# Patient Record
Sex: FEMALE
[De-identification: names, ages, dates, MRNs, and addresses within clinical notes are randomized per-mention and may not be internally consistent; named-entity substitution may affect disease eponyms.]

---

## 2017-01-25 ENCOUNTER — APPOINTMENT (OUTPATIENT)
Dept: HUMAN REPRODUCTION | Facility: CLINIC | Age: 32
End: 2017-01-25

## 2017-01-31 ENCOUNTER — APPOINTMENT (OUTPATIENT)
Dept: HUMAN REPRODUCTION | Facility: CLINIC | Age: 32
End: 2017-01-31

## 2017-02-23 ENCOUNTER — APPOINTMENT (OUTPATIENT)
Dept: HUMAN REPRODUCTION | Facility: CLINIC | Age: 32
End: 2017-02-23

## 2017-02-23 DIAGNOSIS — N97.9 FEMALE INFERTILITY, UNSPECIFIED: ICD-10-CM

## 2017-02-27 ENCOUNTER — RX RENEWAL (OUTPATIENT)
Age: 32
End: 2017-02-27

## 2017-02-27 DIAGNOSIS — E03.9 HYPOTHYROIDISM, UNSPECIFIED: ICD-10-CM

## 2017-03-03 ENCOUNTER — APPOINTMENT (OUTPATIENT)
Dept: HUMAN REPRODUCTION | Facility: CLINIC | Age: 32
End: 2017-03-03

## 2017-03-07 ENCOUNTER — APPOINTMENT (OUTPATIENT)
Dept: HUMAN REPRODUCTION | Facility: CLINIC | Age: 32
End: 2017-03-07

## 2017-08-25 ENCOUNTER — RESULT REVIEW (OUTPATIENT)
Age: 32
End: 2017-08-25

## 2018-08-09 ENCOUNTER — HOSPITAL ENCOUNTER (OUTPATIENT)
Dept: HOSPITAL 14 - H.OPSURG | Age: 33
Setting detail: OBSERVATION
LOS: 1 days | Discharge: HOME | End: 2018-08-10
Attending: OBSTETRICS & GYNECOLOGY | Admitting: OBSTETRICS & GYNECOLOGY
Payer: COMMERCIAL

## 2018-08-09 VITALS — BODY MASS INDEX: 23.8 KG/M2

## 2018-08-09 VITALS — RESPIRATION RATE: 18 BRPM

## 2018-08-09 DIAGNOSIS — N80.1: ICD-10-CM

## 2018-08-09 DIAGNOSIS — N80.5: ICD-10-CM

## 2018-08-09 DIAGNOSIS — D25.9: ICD-10-CM

## 2018-08-09 DIAGNOSIS — N80.0: ICD-10-CM

## 2018-08-09 DIAGNOSIS — N93.8: ICD-10-CM

## 2018-08-09 DIAGNOSIS — N80.4: ICD-10-CM

## 2018-08-09 DIAGNOSIS — N73.6: ICD-10-CM

## 2018-08-09 DIAGNOSIS — N80.3: Primary | ICD-10-CM

## 2018-08-09 DIAGNOSIS — N13.4: ICD-10-CM

## 2018-08-09 LAB
BASOPHILS # BLD AUTO: 0.1 K/UL (ref 0–0.2)
BASOPHILS NFR BLD: 1.4 % (ref 0–2)
EOSINOPHIL # BLD AUTO: 0.1 K/UL (ref 0–0.7)
EOSINOPHIL NFR BLD: 2.7 % (ref 0–4)
ERYTHROCYTE [DISTWIDTH] IN BLOOD BY AUTOMATED COUNT: 14.6 % (ref 11.5–14.5)
HGB BLD-MCNC: 12 G/DL (ref 12–16)
LYMPHOCYTES # BLD AUTO: 1.3 K/UL (ref 1–4.3)
LYMPHOCYTES NFR BLD AUTO: 24.2 % (ref 20–40)
MCH RBC QN AUTO: 25.4 PG (ref 27–31)
MCHC RBC AUTO-ENTMCNC: 32.3 G/DL (ref 33–37)
MCV RBC AUTO: 78.7 FL (ref 81–99)
MONOCYTES # BLD: 0.5 K/UL (ref 0–0.8)
MONOCYTES NFR BLD: 9.1 % (ref 0–10)
NEUTROPHILS # BLD: 3.3 K/UL (ref 1.8–7)
NEUTROPHILS NFR BLD AUTO: 62.6 % (ref 50–75)
NRBC BLD AUTO-RTO: 0.1 % (ref 0–0)
PLATELET # BLD: 242 K/UL (ref 130–400)
PMV BLD AUTO: 9.2 FL (ref 7.2–11.7)
RBC # BLD AUTO: 4.71 MIL/UL (ref 3.8–5.2)
WBC # BLD AUTO: 5.2 K/UL (ref 4.8–10.8)

## 2018-08-09 PROCEDURE — 58561 HYSTEROSCOPY REMOVE MYOMA: CPT

## 2018-08-09 PROCEDURE — 88304 TISSUE EXAM BY PATHOLOGIST: CPT

## 2018-08-09 PROCEDURE — 58559 HYSTEROSCOPY LYSIS: CPT

## 2018-08-09 PROCEDURE — 36415 COLL VENOUS BLD VENIPUNCTURE: CPT

## 2018-08-09 PROCEDURE — 86850 RBC ANTIBODY SCREEN: CPT

## 2018-08-09 PROCEDURE — 52005 CYSTO W/URTRL CATHJ: CPT

## 2018-08-09 PROCEDURE — 88305 TISSUE EXAM BY PATHOLOGIST: CPT

## 2018-08-09 PROCEDURE — 86900 BLOOD TYPING SEROLOGIC ABO: CPT

## 2018-08-09 PROCEDURE — 44970 LAPAROSCOPY APPENDECTOMY: CPT

## 2018-08-09 PROCEDURE — 85025 COMPLETE CBC W/AUTO DIFF WBC: CPT

## 2018-08-09 PROCEDURE — 58560 HYSTEROSCOPY RESECT SEPTUM: CPT

## 2018-08-09 PROCEDURE — 49321 LAPAROSCOPY BIOPSY: CPT

## 2018-08-09 RX ADMIN — HYDROMORPHONE HYDROCHLORIDE PRN MG: 1 INJECTION, SOLUTION INTRAMUSCULAR; INTRAVENOUS; SUBCUTANEOUS at 13:30

## 2018-08-09 RX ADMIN — HYDROMORPHONE HYDROCHLORIDE PRN MG: 1 INJECTION, SOLUTION INTRAMUSCULAR; INTRAVENOUS; SUBCUTANEOUS at 13:12

## 2018-08-09 RX ADMIN — HYDROMORPHONE HYDROCHLORIDE PRN MG: 1 INJECTION, SOLUTION INTRAMUSCULAR; INTRAVENOUS; SUBCUTANEOUS at 13:50

## 2018-08-10 VITALS
HEART RATE: 88 BPM | OXYGEN SATURATION: 99 % | DIASTOLIC BLOOD PRESSURE: 77 MMHG | SYSTOLIC BLOOD PRESSURE: 132 MMHG | TEMPERATURE: 98.4 F

## 2018-08-13 NOTE — OP
PROCEDURE DATE:  08/09/2018



SURGEON: Travis Quinteros MD



ASSISTANT: Louis Swenson MD 



ANESTHESIOLOGIST: 1. Romy SANCHEZ, Janey 2. Nicho Carmona MD



ANESTHETIC: General 



PREOPERATIVE DIAGNOSES:

1.  Incapacitating pelvic pain.

2.  Incapacitating abdominal pain.

3.  Abnormal uterine bleeding.

4.  History of pelvic endometriosis.

5.  History of previous failed medical surgical therapy.

6.  History of severe endometriosis and pelvic adhesions.

7.  Gastrointestinal and genitourinary symptoms.

8.  Rule out interstitial cystitis.

9.  Adenomyosis.



POSTOPERATIVE DIAGNOSES:

1.  Incapacitating pelvic pain.

2.  Incapacitating abdominal pain.

3.  Abnormal uterine bleeding.

4.  History of pelvic endometriosis.

5.  History of previous failed medical surgical therapy.

6.  History of severe endometriosis and pelvic adhesions.

7.  Gastrointestinal and genitourinary symptoms.

8.  Rule out interstitial cystitis.

9.  Adenomyosis.

10.  Severe pelvic endometriosis.

11.  Ovarian adhesions.

12.  Bowel adhesions.

13.  Subseptate uterus.

14.  Mild bilateral hydroureters.



OPERATION PERFORMED:

1.  Examination under anesthesia.

2.  Video_assisted hysteroscopy.

3.  Hysteroscopic septoplasty.

4.  Robotic da Kael laparoscopy.

5.  Enterolysis.

6.  Bilateral ureterolysis.

7.  Bilateral salpingo_ovariolytis.

8.  Multiple peritoneal biopsies and excision of endometriosis.

9.  Treatment of endometriosis.

10.  Shaving of endometriosis of the bowel.

11.  Cystoscopy.

12.  Bilateral ureteral catheterization and injection of IC-Green dye.



Dr. Swenson from General Surgery was consulted to perform 1-Appendectomy 
procedure and he will dictate that separately.



COMPLICATIONS:  None.



SAMPLES:  



DRAINS:  



ESTIMATED BLOOD LOSS:  Minimal.



HISTORY: MD PROVIDES HISTORY



FINDINGS:  



Genitalia:  normal, external genitalia, cervix normal without lesions or 
polyps.  



Hysteroscopy showed evidence of a small septum of the fundus of the uterus and 
no other lesions visualized.  



Cystoscopy was performed to rule out endometriosis of bladder mucosa and also 
interstitial cystitis, also  injury.  The bladder was normal with no evidence 
of stone, trigonitis or cystitis.  Positive jet flow visualized in both 
ureters.  



Laparoscopy was normal, gallbladder was normal, liver edges appeared to be 
normal.  Ascending colon and transverse were normal.  The appendix appeared to 
be abnormal with both fibrosis and thickening.  There was evidence of severe 
adhesions, fibrosis and endometriosis of the rectovaginal septum and attachment 
of the bowel to the posterior aspect of the uterus and to both the right and 
left adnexa.  Both ovaries were severely attached to the posterior aspect of 
the ureters with endometriosis and adhesions.  Fallopian tubes appeared to have 
some inflammatory changes of adhesions, but overall appeared to be normal.  
Both ovaries appeared to be involved with scar.  There was also evidence of 
endometriosis of the rectovaginal septum in right and left perirectal areas and 
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  There 
was also evidence of severe retroperitoneal fibrosis in this area.  There was 
also evidence of mild bilateral hydroureters.



CONSENT:  The patient had been thoroughly evaluated and counseled regarding 
pros and cons of the procedure, the reasonable alternative, and the possible 
complications.  She understood and accepted the risks involved.  Appropriate 
literature was provided to the patient.  The patient was in understanding that 
given her history and presurgical exam, she knows that she was a high risk and 
average patient.  She accepted all the risks involved and all the questions had 
been answered to her satisfaction.



DESCRIPTION OF PROCEDURE:  



Initiation of the case:  After adequate anesthesia was obtained, the patient 
was placed in the dorsal lithotomy position with extreme care of placement of 
the patient without hyperextension or hyperflexing the hips.  At this point, 
the patient was prepped and draped, the surgeon was gowned and gloved.  A time-
out was taken according to the hospital procedure and the procedure was 
started.  At this point, we performed the cystoscopy and bilateral ureteral 
catheterization. 



At this point we performed cystoscopy:

A cystoscope was inserted into the bladder, under direct visualization and the 
bladder was visualized.  The bladder was free of lesions, tumors.  There was no 
evidence of interstitial cystitis, and there was only a mild amount of 
trigonitis.  At this point, both ureters were identified and appeared to be in 
normal anatomical position.  At this point, utilizing an open-ended 5-Gibraltarian 
catheter, the left ureter was catheterized all the way to the distal ureter, 
and a 5 mL of IC-Green were injected into the distal ureter.  Similarly, on the 
contralateral ureter, the ureter was catheterized all the way to the distal 
ureter, and a 5 mL of IC-Green were injected into the distal ureter.  At this 
point, the stents were removed, and the hysteroscope was removed and a 16-
Gibraltarian Hicks was placed into the bladder.  



At this point, we proceeded with a hysteroscopy:

A speculum was placed in the vagina, and the anterior lip of the cervix was 
grasped. The cervix was dilated and a hysteroscope was inserted into the 
cavity. The cavity appeared to be of normal size, but there was evidence of a 
subseptum at the top of the uterus.  At this point, we proceeded with excision 
of uterine septum.  Once the septum was identified, the endoscopic scissors 
were inserted into the uterine cavity and a progressive dissection of the 
septum was performed with great care not to perforate the uterus and not to 
cause any bleeding.  The procedure was basically bloodless and the septum was 
excised.  



At this point, we proceeded with placement of trocars and docking of the Da 
Kael Xi robot

 The surgeons were re-gowned and re-gloved, and an open laparoscopy was 
performed by making an incision below the umbilicus, and the fascia was incised
, and the peritoneum was entered in the blunt fashion.  The cannula was 
inserted and the abdomen was insufflated, and under direct visualization 3 
additional ports were inserted, left upper quadrant, left mid quadrant and 
right upper quadrant.  At this point, the da Kael Xi robot was brought into 
the field and docked, and the instruments were inserted under direct 
visualization.  With extreme care not to injure the bowel or any other area.  
As per the dictation, the upper abdomen appeared to be normal with no evidence 
of any lesions.  The pelvis had the findings described above, which included 
significant adhesions, fibrosis of the posterior cul-de-sac, significant 
endometriosis with deep endometriosis nodules.  Both ovary adherent to both the 
ovarian fossas and the posterior aspect of the uterus with significant 
inflammatory changes.  



At this point, we proceeded with the left ureterolysis.  

The ureter appeared to dilated and it was clearly identified utilizing 
fluorescent technology.  An incision was made on the peritoneum at the top of 
the pelvic brim, and incision was then carried down all the way opening the 
peritoneum and all the way down from the pelvic brim all the way down to the 
ovarian fossa extending the incision below the ovary.  It was a progressive 
dissection where the ureter was progressively lateralized and the peritoneum 
medialized, thus freeing the ureter all the way down to the crossing of the 
uterine vessels.  After this was done and the ureter was freed and lateralized 
and a large area of peritoneum, which had been opened up was excised and sent 
to pathology.  At this point, after ureter had been identified, we were able to 
elevate the ovary and dissect it from the pelvic side wall in the ovarian fossa



At this point, we proceeded with the left ovariolysis.  

The ovary was gently dissected and elevated off the ovarian fossa and area of 
fibrosis of endometriosis were exposed.  



At this point, we proceeded with the right ureterolysis.  

The ureter was identified and again utilizing florescent technology, the 
retroperitoneal space was entered and a full dissection was performed entering 
the retroperitoneal space and dissecting the ureter, removing the ureter 
laterally and the peritoneum medially.  A full dissection was performed all the 
way down to the ovarian fossa, on the crossing of the uterine arteries.  A 
large area of peritoneum containing endometriosis was also dissected and sent 
to Pathology. 



 At this point, we proceeded with a right ovariolysis.  

The ovary was progressively elevated, areas of deep endometriosis and 
superficial endometriosis were dissected out and the ovary was finally 
elevated.  



At this point, we proceeded with a treatment of endometriosis and excision of 
endometriosis.  

On the left hand side, a large area of peritoneum, where containing 
endometriosis was excised in the ovarian fossa with the upper margin of the 
excision at the utero_ovarian ligament all the way down to the uterosacral 
ligament.  Large areas of fibrosis were identified in posterior cul-de-sac and 
the rectovaginal space was affected with endometriosis and severe fibrosis.  
The rectovaginal area was then dissected and the space was opened, and we were 
able to dissect the rectum away from the posterior aspect of the cervix.  
Additional areas of endometriosis were dissected from the posterior aspect of 
the Uterus.  At this point, we proceeded with excision of the endometriosis on 
the right hand side where similarly in a full excision of endometriosis was 
performed by performing incision from starting at the right utero_ovarian 
ligament all the way down to the right uterosacral ligament.  At this point, 
Dr. Swenson from General Surgery was  called in and he performed _____________
procedure, which she will dictate separately.  At this point, it was checked 
for hemostasis and appeared to be excellent.  All the endometriosis had been 
excised.  At this point, we performed the ablation of inflamed peritoneum, 
utilizing the J_plasma device.  There were inflammatory areas in the posterior 
aspect of the uterus, which were not endometriotic, but they were purely 
inflammatory and these were not excisable, as they were not endometriotic.  
Therefore, we proceeded with ablation of such area utilizing the J plasma.  
Once this was done, it was checked for hemostasis and appeared to be excellent.
  The consult was handed to Dr. Swenson, who performed the ___________ and he 
will dictate that separately.  After this was done, it was checked for 
hemostasis and appeared to be excellent.  The pelvis was irrigated.  The da 
Kael Xi robot was removed.  The abdomen desufflated.  The instruments were 
removed.  The incisions were closed in layers with 0 PDS for the fascia and 4-0 
Monocryl for the skin.  The patient was awakened up and taken to recovery room 
in excellent condition.





______________

Travis Quinteros MD
[facility]

## 2021-03-23 ENCOUNTER — APPOINTMENT (OUTPATIENT)
Dept: HUMAN REPRODUCTION | Facility: CLINIC | Age: 36
End: 2021-03-23
Payer: COMMERCIAL

## 2021-03-23 PROCEDURE — 99205 OFFICE O/P NEW HI 60 MIN: CPT | Mod: 95

## 2021-04-20 ENCOUNTER — APPOINTMENT (OUTPATIENT)
Dept: HUMAN REPRODUCTION | Facility: CLINIC | Age: 36
End: 2021-04-20
Payer: COMMERCIAL

## 2021-04-20 PROCEDURE — 76830 TRANSVAGINAL US NON-OB: CPT

## 2021-04-20 PROCEDURE — 36415 COLL VENOUS BLD VENIPUNCTURE: CPT

## 2021-04-20 PROCEDURE — 99072 ADDL SUPL MATRL&STAF TM PHE: CPT

## 2021-04-20 PROCEDURE — 99213 OFFICE O/P EST LOW 20 MIN: CPT | Mod: 25

## 2021-04-23 ENCOUNTER — APPOINTMENT (OUTPATIENT)
Dept: HUMAN REPRODUCTION | Facility: CLINIC | Age: 36
End: 2021-04-23
Payer: COMMERCIAL

## 2021-04-23 PROCEDURE — 76830 TRANSVAGINAL US NON-OB: CPT

## 2021-04-23 PROCEDURE — 99213 OFFICE O/P EST LOW 20 MIN: CPT | Mod: 25

## 2021-04-23 PROCEDURE — 36415 COLL VENOUS BLD VENIPUNCTURE: CPT

## 2021-04-23 PROCEDURE — 99072 ADDL SUPL MATRL&STAF TM PHE: CPT

## 2021-04-26 ENCOUNTER — APPOINTMENT (OUTPATIENT)
Dept: HUMAN REPRODUCTION | Facility: CLINIC | Age: 36
End: 2021-04-26
Payer: COMMERCIAL

## 2021-04-26 PROCEDURE — 99072 ADDL SUPL MATRL&STAF TM PHE: CPT

## 2021-04-26 PROCEDURE — 76830 TRANSVAGINAL US NON-OB: CPT

## 2021-04-26 PROCEDURE — 36415 COLL VENOUS BLD VENIPUNCTURE: CPT

## 2021-04-26 PROCEDURE — 99213 OFFICE O/P EST LOW 20 MIN: CPT | Mod: 25

## 2021-04-27 ENCOUNTER — APPOINTMENT (OUTPATIENT)
Dept: HUMAN REPRODUCTION | Facility: CLINIC | Age: 36
End: 2021-04-27
Payer: COMMERCIAL

## 2021-04-27 PROCEDURE — 36415 COLL VENOUS BLD VENIPUNCTURE: CPT

## 2021-04-27 PROCEDURE — 99213 OFFICE O/P EST LOW 20 MIN: CPT | Mod: 25

## 2021-04-27 PROCEDURE — 76830 TRANSVAGINAL US NON-OB: CPT

## 2021-04-27 PROCEDURE — 99072 ADDL SUPL MATRL&STAF TM PHE: CPT

## 2021-04-28 ENCOUNTER — APPOINTMENT (OUTPATIENT)
Dept: HUMAN REPRODUCTION | Facility: CLINIC | Age: 36
End: 2021-04-28
Payer: COMMERCIAL

## 2021-04-28 PROCEDURE — 76830 TRANSVAGINAL US NON-OB: CPT

## 2021-04-28 PROCEDURE — 36415 COLL VENOUS BLD VENIPUNCTURE: CPT

## 2021-04-28 PROCEDURE — 99072 ADDL SUPL MATRL&STAF TM PHE: CPT

## 2021-04-28 PROCEDURE — 99214 OFFICE O/P EST MOD 30 MIN: CPT | Mod: 25

## 2021-04-30 ENCOUNTER — APPOINTMENT (OUTPATIENT)
Dept: HUMAN REPRODUCTION | Facility: CLINIC | Age: 36
End: 2021-04-30
Payer: COMMERCIAL

## 2021-04-30 PROCEDURE — 99072 ADDL SUPL MATRL&STAF TM PHE: CPT

## 2021-04-30 PROCEDURE — 99213 OFFICE O/P EST LOW 20 MIN: CPT | Mod: 25

## 2021-04-30 PROCEDURE — 58322 ARTIFICIAL INSEMINATION: CPT

## 2021-04-30 PROCEDURE — 89260 SPERM ISOLATION SIMPLE: CPT

## 2021-05-05 ENCOUNTER — APPOINTMENT (OUTPATIENT)
Dept: ENDOCRINOLOGY | Facility: CLINIC | Age: 36
End: 2021-05-05
Payer: COMMERCIAL

## 2021-05-05 VITALS
HEIGHT: 62 IN | SYSTOLIC BLOOD PRESSURE: 120 MMHG | BODY MASS INDEX: 27.23 KG/M2 | DIASTOLIC BLOOD PRESSURE: 80 MMHG | WEIGHT: 148 LBS

## 2021-05-05 DIAGNOSIS — Z87.19 PERSONAL HISTORY OF OTHER DISEASES OF THE DIGESTIVE SYSTEM: ICD-10-CM

## 2021-05-05 DIAGNOSIS — Z87.09 PERSONAL HISTORY OF OTHER DISEASES OF THE RESPIRATORY SYSTEM: ICD-10-CM

## 2021-05-05 DIAGNOSIS — Z78.9 OTHER SPECIFIED HEALTH STATUS: ICD-10-CM

## 2021-05-05 DIAGNOSIS — Z86.2 PERSONAL HISTORY OF DISEASES OF THE BLOOD AND BLOOD-FORMING ORGANS AND CERTAIN DISORDERS INVOLVING THE IMMUNE MECHANISM: ICD-10-CM

## 2021-05-05 DIAGNOSIS — Z87.898 PERSONAL HISTORY OF OTHER SPECIFIED CONDITIONS: ICD-10-CM

## 2021-05-05 DIAGNOSIS — Z83.3 FAMILY HISTORY OF DIABETES MELLITUS: ICD-10-CM

## 2021-05-05 PROCEDURE — 99204 OFFICE O/P NEW MOD 45 MIN: CPT

## 2021-05-05 PROCEDURE — 99072 ADDL SUPL MATRL&STAF TM PHE: CPT

## 2021-05-05 PROCEDURE — 99214 OFFICE O/P EST MOD 30 MIN: CPT

## 2021-05-05 RX ORDER — LETROZOLE TABLETS 2.5 MG/1
2.5 TABLET, FILM COATED ORAL DAILY
Qty: 5 | Refills: 0 | Status: DISCONTINUED | COMMUNITY
Start: 2017-02-23 | End: 2021-05-05

## 2021-05-05 RX ORDER — LEVOTHYROXINE SODIUM 0.05 MG/1
50 TABLET ORAL DAILY
Qty: 30 | Refills: 6 | Status: DISCONTINUED | COMMUNITY
Start: 2017-02-27 | End: 2021-05-05

## 2021-05-27 PROBLEM — Z78.9 CURRENT NON-SMOKER: Status: ACTIVE | Noted: 2021-05-27

## 2021-05-27 PROBLEM — Z87.19 HISTORY OF GASTROESOPHAGEAL REFLUX (GERD): Status: RESOLVED | Noted: 2021-05-27 | Resolved: 2021-05-27

## 2021-05-27 PROBLEM — Z86.2 HISTORY OF ANEMIA: Status: RESOLVED | Noted: 2021-05-27 | Resolved: 2021-05-27

## 2021-05-27 PROBLEM — Z87.19 HISTORY OF IBS: Status: RESOLVED | Noted: 2021-05-27 | Resolved: 2021-05-27

## 2021-05-27 PROBLEM — Z87.09 HISTORY OF ASTHMA: Status: RESOLVED | Noted: 2021-05-27 | Resolved: 2021-05-27

## 2021-05-27 PROBLEM — Z83.3 FAMILY HISTORY OF TYPE 2 DIABETES MELLITUS: Status: ACTIVE | Noted: 2021-05-27

## 2021-05-27 PROBLEM — Z87.898 HISTORY OF HEADACHE: Status: RESOLVED | Noted: 2021-05-27 | Resolved: 2021-05-27

## 2021-05-27 RX ORDER — PNV/FERROUS SULFATE/FOLIC ACID 27-<0.5MG
TABLET ORAL
Refills: 0 | Status: ACTIVE | COMMUNITY

## 2021-05-27 RX ORDER — ALBUTEROL 90 MCG
90 AEROSOL (GRAM) INHALATION
Refills: 0 | Status: ACTIVE | COMMUNITY

## 2021-05-27 RX ORDER — OMEPRAZOLE 20 MG/1
20 CAPSULE, DELAYED RELEASE ORAL
Refills: 0 | Status: ACTIVE | COMMUNITY

## 2021-05-27 NOTE — REVIEW OF SYSTEMS
[Negative] : Heme/Lymph [FreeTextEntry7] : occ nausea  [de-identified] : has HA at times s nupur neurology  [de-identified] : menses are regualr

## 2021-05-27 NOTE — HISTORY OF PRESENT ILLNESS
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

## 2021-05-27 NOTE — ASSESSMENT
[FreeTextEntry1] : Hypothyroidism inpatient who is trying to get pregnant patient is status post IUI April 30\par Had recently had levothyroxine increased to 50 mcg once a day\par Patient has been taking correctly\par  will check updated labs and adjust accordingly\par Check sonogram of thyroid\par \par History of anemia patient had been seeing your blood and cancer specialists0 we will check iron TIBC ferritin\par \par Family history diabetes check A1c

## 2021-06-02 ENCOUNTER — APPOINTMENT (OUTPATIENT)
Dept: ENDOCRINOLOGY | Facility: CLINIC | Age: 36
End: 2021-06-02

## 2021-07-06 ENCOUNTER — APPOINTMENT (OUTPATIENT)
Dept: HUMAN REPRODUCTION | Facility: CLINIC | Age: 36
End: 2021-07-06
Payer: COMMERCIAL

## 2021-07-06 PROCEDURE — 99215 OFFICE O/P EST HI 40 MIN: CPT | Mod: 95

## 2021-07-13 ENCOUNTER — APPOINTMENT (OUTPATIENT)
Dept: HUMAN REPRODUCTION | Facility: CLINIC | Age: 36
End: 2021-07-13
Payer: COMMERCIAL

## 2021-07-13 PROCEDURE — 36415 COLL VENOUS BLD VENIPUNCTURE: CPT

## 2021-07-13 PROCEDURE — 99072 ADDL SUPL MATRL&STAF TM PHE: CPT

## 2021-07-13 PROCEDURE — 76830 TRANSVAGINAL US NON-OB: CPT

## 2021-07-13 PROCEDURE — 99213 OFFICE O/P EST LOW 20 MIN: CPT | Mod: 25

## 2021-07-15 ENCOUNTER — APPOINTMENT (OUTPATIENT)
Dept: HUMAN REPRODUCTION | Facility: CLINIC | Age: 36
End: 2021-07-15
Payer: COMMERCIAL

## 2021-07-15 PROCEDURE — 99072 ADDL SUPL MATRL&STAF TM PHE: CPT

## 2021-07-15 PROCEDURE — 58340 CATHETER FOR HYSTEROGRAPHY: CPT

## 2021-07-15 PROCEDURE — 76831 ECHO EXAM UTERUS: CPT

## 2021-07-15 PROCEDURE — 99213 OFFICE O/P EST LOW 20 MIN: CPT | Mod: 25

## 2021-07-17 ENCOUNTER — APPOINTMENT (OUTPATIENT)
Dept: HUMAN REPRODUCTION | Facility: CLINIC | Age: 36
End: 2021-07-17

## 2021-07-19 ENCOUNTER — LABORATORY RESULT (OUTPATIENT)
Age: 36
End: 2021-07-19

## 2021-07-27 ENCOUNTER — APPOINTMENT (OUTPATIENT)
Dept: ENDOCRINOLOGY | Facility: CLINIC | Age: 36
End: 2021-07-27

## 2021-08-10 ENCOUNTER — APPOINTMENT (OUTPATIENT)
Dept: ENDOCRINOLOGY | Facility: CLINIC | Age: 36
End: 2021-08-10

## 2021-10-10 ENCOUNTER — RX RENEWAL (OUTPATIENT)
Age: 36
End: 2021-10-10

## 2021-10-10 RX ORDER — LEVOTHYROXINE SODIUM 50 UG/1
50 TABLET ORAL DAILY
Qty: 30 | Refills: 4 | Status: ACTIVE | COMMUNITY
Start: 2021-10-10 | End: 1900-01-01

## 2022-07-19 ENCOUNTER — APPOINTMENT (OUTPATIENT)
Dept: HUMAN REPRODUCTION | Facility: CLINIC | Age: 37
End: 2022-07-19

## 2022-07-19 PROCEDURE — 99215 OFFICE O/P EST HI 40 MIN: CPT | Mod: 95

## 2022-08-12 ENCOUNTER — APPOINTMENT (OUTPATIENT)
Dept: HUMAN REPRODUCTION | Facility: CLINIC | Age: 37
End: 2022-08-12

## 2022-08-12 PROCEDURE — 99213 OFFICE O/P EST LOW 20 MIN: CPT | Mod: 25

## 2022-08-12 PROCEDURE — 76830 TRANSVAGINAL US NON-OB: CPT

## 2022-08-12 PROCEDURE — 36415 COLL VENOUS BLD VENIPUNCTURE: CPT

## 2022-08-17 ENCOUNTER — APPOINTMENT (OUTPATIENT)
Dept: HUMAN REPRODUCTION | Facility: CLINIC | Age: 37
End: 2022-08-17

## 2022-08-17 PROCEDURE — 58340 CATHETER FOR HYSTEROGRAPHY: CPT

## 2022-08-17 PROCEDURE — 76831 ECHO EXAM UTERUS: CPT

## 2022-08-17 PROCEDURE — 99213 OFFICE O/P EST LOW 20 MIN: CPT | Mod: 25

## 2022-08-25 ENCOUNTER — APPOINTMENT (OUTPATIENT)
Dept: HUMAN REPRODUCTION | Facility: CLINIC | Age: 37
End: 2022-08-25

## 2022-08-25 PROCEDURE — 36415 COLL VENOUS BLD VENIPUNCTURE: CPT

## 2022-08-25 PROCEDURE — 99213 OFFICE O/P EST LOW 20 MIN: CPT | Mod: 25

## 2022-08-25 PROCEDURE — 76830 TRANSVAGINAL US NON-OB: CPT

## 2022-09-01 ENCOUNTER — APPOINTMENT (OUTPATIENT)
Dept: HUMAN REPRODUCTION | Facility: CLINIC | Age: 37
End: 2022-09-01

## 2022-09-01 ENCOUNTER — RESULT REVIEW (OUTPATIENT)
Age: 37
End: 2022-09-01

## 2022-09-01 PROCEDURE — 58100 BIOPSY OF UTERUS LINING: CPT

## 2022-09-01 PROCEDURE — 76856 US EXAM PELVIC COMPLETE: CPT

## 2022-09-01 PROCEDURE — 99072 ADDL SUPL MATRL&STAF TM PHE: CPT

## 2022-09-01 PROCEDURE — 58999 UNLISTED PX FML GENITAL SYS: CPT

## 2022-09-01 PROCEDURE — 99213 OFFICE O/P EST LOW 20 MIN: CPT | Mod: 25

## 2022-10-04 ENCOUNTER — TRANSCRIPTION ENCOUNTER (OUTPATIENT)
Age: 37
End: 2022-10-04

## 2022-11-02 ENCOUNTER — APPOINTMENT (OUTPATIENT)
Dept: HUMAN REPRODUCTION | Facility: CLINIC | Age: 37
End: 2022-11-02

## 2022-11-02 PROCEDURE — 76830 TRANSVAGINAL US NON-OB: CPT

## 2022-11-02 PROCEDURE — 36415 COLL VENOUS BLD VENIPUNCTURE: CPT

## 2022-11-02 PROCEDURE — 99215 OFFICE O/P EST HI 40 MIN: CPT | Mod: 25

## 2022-11-09 ENCOUNTER — APPOINTMENT (OUTPATIENT)
Dept: HUMAN REPRODUCTION | Facility: CLINIC | Age: 37
End: 2022-11-09

## 2022-11-09 PROCEDURE — 99213 OFFICE O/P EST LOW 20 MIN: CPT | Mod: 25

## 2022-11-09 PROCEDURE — 36415 COLL VENOUS BLD VENIPUNCTURE: CPT

## 2022-11-09 PROCEDURE — 76830 TRANSVAGINAL US NON-OB: CPT

## 2022-11-17 ENCOUNTER — APPOINTMENT (OUTPATIENT)
Dept: HUMAN REPRODUCTION | Facility: CLINIC | Age: 37
End: 2022-11-17

## 2022-11-17 PROCEDURE — 58999I: CUSTOM

## 2022-11-17 PROCEDURE — 76856 US EXAM PELVIC COMPLETE: CPT

## 2022-12-28 ENCOUNTER — APPOINTMENT (OUTPATIENT)
Dept: HUMAN REPRODUCTION | Facility: CLINIC | Age: 37
End: 2022-12-28
Payer: COMMERCIAL

## 2022-12-28 PROCEDURE — 99213 OFFICE O/P EST LOW 20 MIN: CPT | Mod: 25

## 2022-12-28 PROCEDURE — 76830 TRANSVAGINAL US NON-OB: CPT

## 2022-12-28 PROCEDURE — 36415 COLL VENOUS BLD VENIPUNCTURE: CPT

## 2023-01-04 ENCOUNTER — APPOINTMENT (OUTPATIENT)
Dept: HUMAN REPRODUCTION | Facility: CLINIC | Age: 38
End: 2023-01-04
Payer: COMMERCIAL

## 2023-01-04 PROCEDURE — 76857 US EXAM PELVIC LIMITED: CPT

## 2023-01-04 PROCEDURE — 99213 OFFICE O/P EST LOW 20 MIN: CPT | Mod: 25

## 2023-01-04 PROCEDURE — 36415 COLL VENOUS BLD VENIPUNCTURE: CPT

## 2023-01-05 ENCOUNTER — APPOINTMENT (OUTPATIENT)
Dept: HUMAN REPRODUCTION | Facility: CLINIC | Age: 38
End: 2023-01-05
Payer: COMMERCIAL

## 2023-01-05 PROCEDURE — 76857 US EXAM PELVIC LIMITED: CPT

## 2023-01-05 PROCEDURE — 99213 OFFICE O/P EST LOW 20 MIN: CPT | Mod: 25

## 2023-01-05 PROCEDURE — 36415 COLL VENOUS BLD VENIPUNCTURE: CPT

## 2023-01-06 ENCOUNTER — APPOINTMENT (OUTPATIENT)
Dept: HUMAN REPRODUCTION | Facility: CLINIC | Age: 38
End: 2023-01-06
Payer: COMMERCIAL

## 2023-01-06 PROCEDURE — 36415 COLL VENOUS BLD VENIPUNCTURE: CPT

## 2023-01-06 PROCEDURE — 76857 US EXAM PELVIC LIMITED: CPT

## 2023-01-06 PROCEDURE — 99213 OFFICE O/P EST LOW 20 MIN: CPT | Mod: 25

## 2023-01-11 ENCOUNTER — APPOINTMENT (OUTPATIENT)
Dept: HUMAN REPRODUCTION | Facility: CLINIC | Age: 38
End: 2023-01-11
Payer: COMMERCIAL

## 2023-01-11 PROCEDURE — 89398A: CUSTOM

## 2023-01-11 PROCEDURE — 58974 EMBRYO TRANSFER INTRAUTERINE: CPT

## 2023-01-11 PROCEDURE — 76998 US GUIDE INTRAOP: CPT

## 2023-01-11 PROCEDURE — 89255 PREPARE EMBRYO FOR TRANSFER: CPT

## 2023-01-11 PROCEDURE — 89352 THAWING CRYOPRESRVED EMBRYO: CPT

## 2023-01-11 PROCEDURE — 99072 ADDL SUPL MATRL&STAF TM PHE: CPT

## 2023-01-12 ENCOUNTER — APPOINTMENT (OUTPATIENT)
Dept: HUMAN REPRODUCTION | Facility: CLINIC | Age: 38
End: 2023-01-12
Payer: COMMERCIAL

## 2023-01-17 ENCOUNTER — APPOINTMENT (OUTPATIENT)
Dept: HUMAN REPRODUCTION | Facility: CLINIC | Age: 38
End: 2023-01-17
Payer: COMMERCIAL

## 2023-01-17 PROCEDURE — 36415 COLL VENOUS BLD VENIPUNCTURE: CPT

## 2023-01-20 ENCOUNTER — APPOINTMENT (OUTPATIENT)
Dept: HUMAN REPRODUCTION | Facility: CLINIC | Age: 38
End: 2023-01-20

## 2023-01-20 ENCOUNTER — APPOINTMENT (OUTPATIENT)
Dept: HUMAN REPRODUCTION | Facility: CLINIC | Age: 38
End: 2023-01-20
Payer: COMMERCIAL

## 2023-01-20 PROCEDURE — 36415 COLL VENOUS BLD VENIPUNCTURE: CPT

## 2023-01-31 ENCOUNTER — APPOINTMENT (OUTPATIENT)
Dept: HUMAN REPRODUCTION | Facility: CLINIC | Age: 38
End: 2023-01-31
Payer: COMMERCIAL

## 2023-01-31 PROCEDURE — 99215 OFFICE O/P EST HI 40 MIN: CPT

## 2023-02-10 ENCOUNTER — APPOINTMENT (OUTPATIENT)
Dept: HUMAN REPRODUCTION | Facility: CLINIC | Age: 38
End: 2023-02-10
Payer: COMMERCIAL

## 2023-02-10 PROCEDURE — 99215 OFFICE O/P EST HI 40 MIN: CPT

## 2023-02-23 ENCOUNTER — APPOINTMENT (OUTPATIENT)
Dept: HUMAN REPRODUCTION | Facility: CLINIC | Age: 38
End: 2023-02-23
Payer: COMMERCIAL

## 2023-02-23 PROCEDURE — 36415 COLL VENOUS BLD VENIPUNCTURE: CPT

## 2023-02-23 PROCEDURE — 99213 OFFICE O/P EST LOW 20 MIN: CPT | Mod: 25

## 2023-02-23 PROCEDURE — 76830 TRANSVAGINAL US NON-OB: CPT

## 2023-03-22 ENCOUNTER — APPOINTMENT (OUTPATIENT)
Dept: HUMAN REPRODUCTION | Facility: CLINIC | Age: 38
End: 2023-03-22
Payer: COMMERCIAL

## 2023-03-22 PROCEDURE — 36415 COLL VENOUS BLD VENIPUNCTURE: CPT

## 2023-03-22 PROCEDURE — 76830 TRANSVAGINAL US NON-OB: CPT

## 2023-03-22 PROCEDURE — 99213 OFFICE O/P EST LOW 20 MIN: CPT | Mod: 25

## 2023-04-05 ENCOUNTER — APPOINTMENT (OUTPATIENT)
Dept: HUMAN REPRODUCTION | Facility: CLINIC | Age: 38
End: 2023-04-05
Payer: COMMERCIAL

## 2023-04-05 PROCEDURE — 36415 COLL VENOUS BLD VENIPUNCTURE: CPT

## 2023-04-05 PROCEDURE — 99213 OFFICE O/P EST LOW 20 MIN: CPT | Mod: 25

## 2023-04-05 PROCEDURE — 76857 US EXAM PELVIC LIMITED: CPT

## 2023-04-07 ENCOUNTER — APPOINTMENT (OUTPATIENT)
Dept: HUMAN REPRODUCTION | Facility: CLINIC | Age: 38
End: 2023-04-07
Payer: COMMERCIAL

## 2023-04-07 PROCEDURE — 36415 COLL VENOUS BLD VENIPUNCTURE: CPT

## 2023-04-07 PROCEDURE — 76857 US EXAM PELVIC LIMITED: CPT

## 2023-04-07 PROCEDURE — 99213 OFFICE O/P EST LOW 20 MIN: CPT | Mod: 25

## 2023-04-10 ENCOUNTER — APPOINTMENT (OUTPATIENT)
Dept: HUMAN REPRODUCTION | Facility: CLINIC | Age: 38
End: 2023-04-10

## 2023-04-12 ENCOUNTER — APPOINTMENT (OUTPATIENT)
Dept: HUMAN REPRODUCTION | Facility: CLINIC | Age: 38
End: 2023-04-12

## 2023-04-18 ENCOUNTER — APPOINTMENT (OUTPATIENT)
Dept: HUMAN REPRODUCTION | Facility: CLINIC | Age: 38
End: 2023-04-18
Payer: COMMERCIAL

## 2023-04-18 PROCEDURE — 76857 US EXAM PELVIC LIMITED: CPT

## 2023-04-18 PROCEDURE — 36415 COLL VENOUS BLD VENIPUNCTURE: CPT

## 2023-04-18 PROCEDURE — 99213 OFFICE O/P EST LOW 20 MIN: CPT | Mod: 25

## 2023-04-20 ENCOUNTER — APPOINTMENT (OUTPATIENT)
Dept: HUMAN REPRODUCTION | Facility: CLINIC | Age: 38
End: 2023-04-20
Payer: COMMERCIAL

## 2023-04-20 PROCEDURE — 36415 COLL VENOUS BLD VENIPUNCTURE: CPT

## 2023-04-22 ENCOUNTER — APPOINTMENT (OUTPATIENT)
Dept: HUMAN REPRODUCTION | Facility: CLINIC | Age: 38
End: 2023-04-22
Payer: COMMERCIAL

## 2023-04-22 PROCEDURE — 36415 COLL VENOUS BLD VENIPUNCTURE: CPT

## 2023-04-22 PROCEDURE — 99213 OFFICE O/P EST LOW 20 MIN: CPT | Mod: 25

## 2023-04-22 PROCEDURE — 76857 US EXAM PELVIC LIMITED: CPT

## 2023-04-24 ENCOUNTER — APPOINTMENT (OUTPATIENT)
Dept: HUMAN REPRODUCTION | Facility: CLINIC | Age: 38
End: 2023-04-24
Payer: COMMERCIAL

## 2023-04-24 PROCEDURE — 76857 US EXAM PELVIC LIMITED: CPT

## 2023-04-24 PROCEDURE — 36415 COLL VENOUS BLD VENIPUNCTURE: CPT

## 2023-04-24 PROCEDURE — 99213 OFFICE O/P EST LOW 20 MIN: CPT | Mod: 25

## 2023-04-25 ENCOUNTER — APPOINTMENT (OUTPATIENT)
Dept: HUMAN REPRODUCTION | Facility: CLINIC | Age: 38
End: 2023-04-25
Payer: COMMERCIAL

## 2023-04-25 PROCEDURE — 36415 COLL VENOUS BLD VENIPUNCTURE: CPT

## 2023-04-25 PROCEDURE — 99213 OFFICE O/P EST LOW 20 MIN: CPT | Mod: 25

## 2023-04-25 PROCEDURE — 76857 US EXAM PELVIC LIMITED: CPT

## 2023-04-26 ENCOUNTER — APPOINTMENT (OUTPATIENT)
Dept: HUMAN REPRODUCTION | Facility: CLINIC | Age: 38
End: 2023-04-26
Payer: COMMERCIAL

## 2023-04-26 PROCEDURE — 76857 US EXAM PELVIC LIMITED: CPT

## 2023-04-26 PROCEDURE — 99213 OFFICE O/P EST LOW 20 MIN: CPT | Mod: 25

## 2023-04-26 PROCEDURE — 36415 COLL VENOUS BLD VENIPUNCTURE: CPT

## 2023-04-27 ENCOUNTER — APPOINTMENT (OUTPATIENT)
Dept: HUMAN REPRODUCTION | Facility: CLINIC | Age: 38
End: 2023-04-27
Payer: COMMERCIAL

## 2023-04-27 PROCEDURE — 99213 OFFICE O/P EST LOW 20 MIN: CPT | Mod: 25

## 2023-04-27 PROCEDURE — 36415 COLL VENOUS BLD VENIPUNCTURE: CPT

## 2023-04-27 PROCEDURE — 76857 US EXAM PELVIC LIMITED: CPT

## 2023-04-28 ENCOUNTER — APPOINTMENT (OUTPATIENT)
Dept: HUMAN REPRODUCTION | Facility: CLINIC | Age: 38
End: 2023-04-28
Payer: COMMERCIAL

## 2023-04-28 PROCEDURE — 36415 COLL VENOUS BLD VENIPUNCTURE: CPT

## 2023-04-28 PROCEDURE — 99212 OFFICE O/P EST SF 10 MIN: CPT

## 2023-04-29 ENCOUNTER — APPOINTMENT (OUTPATIENT)
Dept: HUMAN REPRODUCTION | Facility: CLINIC | Age: 38
End: 2023-04-29
Payer: COMMERCIAL

## 2023-04-29 PROCEDURE — 76948 ECHO GUIDE OVA ASPIRATION: CPT

## 2023-04-29 PROCEDURE — 89280 ASSIST OOCYTE FERTILIZATION: CPT

## 2023-04-29 PROCEDURE — 89254 OOCYTE IDENTIFICATION: CPT

## 2023-04-29 PROCEDURE — 89250 CULTR OOCYTE/EMBRYO <4 DAYS: CPT

## 2023-04-29 PROCEDURE — 58970 RETRIEVAL OF OOCYTE: CPT

## 2023-04-29 PROCEDURE — 89261 SPERM ISOLATION COMPLEX: CPT

## 2023-04-30 ENCOUNTER — APPOINTMENT (OUTPATIENT)
Dept: HUMAN REPRODUCTION | Facility: CLINIC | Age: 38
End: 2023-04-30
Payer: COMMERCIAL

## 2023-05-02 PROCEDURE — 89253 EMBRYO HATCHING: CPT

## 2023-05-04 PROCEDURE — 89342 STORAGE/YEAR EMBRYO(S): CPT

## 2023-05-04 PROCEDURE — 89290 BIOPSY OOCYTE POLAR BODY <=5: CPT

## 2023-05-04 PROCEDURE — 89272 EXTENDED CULTURE OF OOCYTES: CPT

## 2023-05-04 PROCEDURE — 89258 CRYOPRESERVATION EMBRYO(S): CPT

## 2023-05-16 ENCOUNTER — APPOINTMENT (OUTPATIENT)
Dept: HUMAN REPRODUCTION | Facility: CLINIC | Age: 38
End: 2023-05-16
Payer: COMMERCIAL

## 2023-05-16 PROCEDURE — 99213 OFFICE O/P EST LOW 20 MIN: CPT | Mod: 25

## 2023-05-16 PROCEDURE — 36415 COLL VENOUS BLD VENIPUNCTURE: CPT

## 2023-05-16 PROCEDURE — 76830 TRANSVAGINAL US NON-OB: CPT

## 2023-06-01 ENCOUNTER — APPOINTMENT (OUTPATIENT)
Dept: HUMAN REPRODUCTION | Facility: CLINIC | Age: 38
End: 2023-06-01
Payer: COMMERCIAL

## 2023-06-01 PROCEDURE — 99213 OFFICE O/P EST LOW 20 MIN: CPT | Mod: 25

## 2023-06-01 PROCEDURE — 36415 COLL VENOUS BLD VENIPUNCTURE: CPT

## 2023-06-01 PROCEDURE — 76857 US EXAM PELVIC LIMITED: CPT

## 2023-06-07 ENCOUNTER — APPOINTMENT (OUTPATIENT)
Dept: HUMAN REPRODUCTION | Facility: CLINIC | Age: 38
End: 2023-06-07
Payer: COMMERCIAL

## 2023-06-07 PROCEDURE — 36415 COLL VENOUS BLD VENIPUNCTURE: CPT

## 2023-06-07 PROCEDURE — 99213 OFFICE O/P EST LOW 20 MIN: CPT | Mod: 25

## 2023-06-07 PROCEDURE — 76857 US EXAM PELVIC LIMITED: CPT

## 2023-06-12 ENCOUNTER — APPOINTMENT (OUTPATIENT)
Dept: HUMAN REPRODUCTION | Facility: CLINIC | Age: 38
End: 2023-06-12
Payer: COMMERCIAL

## 2023-06-12 PROCEDURE — 89255 PREPARE EMBRYO FOR TRANSFER: CPT

## 2023-06-12 PROCEDURE — 76998 US GUIDE INTRAOP: CPT

## 2023-06-12 PROCEDURE — 89398A: CUSTOM

## 2023-06-12 PROCEDURE — 58974 EMBRYO TRANSFER INTRAUTERINE: CPT

## 2023-06-12 PROCEDURE — 89352 THAWING CRYOPRESRVED EMBRYO: CPT

## 2023-06-13 ENCOUNTER — APPOINTMENT (OUTPATIENT)
Dept: HUMAN REPRODUCTION | Facility: CLINIC | Age: 38
End: 2023-06-13
Payer: COMMERCIAL

## 2023-06-21 ENCOUNTER — APPOINTMENT (OUTPATIENT)
Dept: HUMAN REPRODUCTION | Facility: CLINIC | Age: 38
End: 2023-06-21
Payer: COMMERCIAL

## 2023-06-21 PROCEDURE — 36415 COLL VENOUS BLD VENIPUNCTURE: CPT

## 2023-06-23 ENCOUNTER — APPOINTMENT (OUTPATIENT)
Dept: HUMAN REPRODUCTION | Facility: CLINIC | Age: 38
End: 2023-06-23
Payer: COMMERCIAL

## 2023-06-23 PROCEDURE — 36415 COLL VENOUS BLD VENIPUNCTURE: CPT

## 2023-07-03 ENCOUNTER — APPOINTMENT (OUTPATIENT)
Dept: HUMAN REPRODUCTION | Facility: CLINIC | Age: 38
End: 2023-07-03
Payer: COMMERCIAL

## 2023-07-03 PROCEDURE — 36415 COLL VENOUS BLD VENIPUNCTURE: CPT

## 2023-07-03 PROCEDURE — 76830 TRANSVAGINAL US NON-OB: CPT

## 2023-07-03 PROCEDURE — 99213 OFFICE O/P EST LOW 20 MIN: CPT | Mod: 25

## 2023-07-11 ENCOUNTER — APPOINTMENT (OUTPATIENT)
Dept: HUMAN REPRODUCTION | Facility: CLINIC | Age: 38
End: 2023-07-11
Payer: COMMERCIAL

## 2023-07-11 PROCEDURE — 99213 OFFICE O/P EST LOW 20 MIN: CPT | Mod: 25

## 2023-07-11 PROCEDURE — 76817 TRANSVAGINAL US OBSTETRIC: CPT

## 2023-07-11 PROCEDURE — 36415 COLL VENOUS BLD VENIPUNCTURE: CPT

## 2023-07-17 ENCOUNTER — APPOINTMENT (OUTPATIENT)
Dept: HUMAN REPRODUCTION | Facility: CLINIC | Age: 38
End: 2023-07-17
Payer: COMMERCIAL

## 2023-07-17 PROCEDURE — 99213 OFFICE O/P EST LOW 20 MIN: CPT | Mod: 25

## 2023-07-17 PROCEDURE — 76817 TRANSVAGINAL US OBSTETRIC: CPT
